# Patient Record
Sex: FEMALE | URBAN - METROPOLITAN AREA
[De-identification: names, ages, dates, MRNs, and addresses within clinical notes are randomized per-mention and may not be internally consistent; named-entity substitution may affect disease eponyms.]

---

## 2019-10-15 NOTE — PATIENT DISCUSSION
Recommend LID HYGIENE with warm compress and/or eyelid wash at least once a day. Suggest Avenova lid treatment OU qhs or Ocusoft.

## 2019-11-25 ENCOUNTER — APPOINTMENT (RX ONLY)
Dept: URBAN - METROPOLITAN AREA CLINIC 87 | Facility: CLINIC | Age: 49
Setting detail: DERMATOLOGY
End: 2019-11-25

## 2019-11-25 DIAGNOSIS — L29.8 OTHER PRURITUS: ICD-10-CM

## 2019-11-25 DIAGNOSIS — S31000A OPEN WOUND(S) (MULTIPLE) OF UNSPECIFIED SITE(S), WITHOUT MENTION OF COMPLICATION: ICD-10-CM

## 2019-11-25 DIAGNOSIS — L29.89 OTHER PRURITUS: ICD-10-CM

## 2019-11-25 DIAGNOSIS — L73.9 FOLLICULAR DISORDER, UNSPECIFIED: ICD-10-CM

## 2019-11-25 DIAGNOSIS — L663 OTHER SPECIFIED DISEASES OF HAIR AND HAIR FOLLICLES: ICD-10-CM

## 2019-11-25 DIAGNOSIS — L738 OTHER SPECIFIED DISEASES OF HAIR AND HAIR FOLLICLES: ICD-10-CM

## 2019-11-25 PROBLEM — L02.426 FURUNCLE OF LEFT LOWER LIMB: Status: ACTIVE | Noted: 2019-11-25

## 2019-11-25 PROBLEM — L02.425 FURUNCLE OF RIGHT LOWER LIMB: Status: ACTIVE | Noted: 2019-11-25

## 2019-11-25 PROBLEM — L02.821 FURUNCLE OF HEAD [ANY PART, EXCEPT FACE]: Status: ACTIVE | Noted: 2019-11-25

## 2019-11-25 PROBLEM — S81.801A UNSPECIFIED OPEN WOUND, RIGHT LOWER LEG, INITIAL ENCOUNTER: Status: ACTIVE | Noted: 2019-11-25

## 2019-11-25 PROCEDURE — 99202 OFFICE O/P NEW SF 15 MIN: CPT

## 2019-11-25 PROCEDURE — ? PRESCRIPTION

## 2019-11-25 PROCEDURE — ? TREATMENT REGIMEN

## 2019-11-25 PROCEDURE — ? ORDER TESTS

## 2019-11-25 PROCEDURE — ? COUNSELING

## 2019-11-25 RX ORDER — CRISABOROLE 20 MG/G
OINTMENT TOPICAL
Qty: 1 | Refills: 3 | Status: ERX | COMMUNITY
Start: 2019-11-25

## 2019-11-25 RX ORDER — MUPIROCIN 20 MG/G
OINTMENT TOPICAL
Qty: 1 | Refills: 0 | Status: ERX | COMMUNITY
Start: 2019-11-25

## 2019-11-25 RX ADMIN — MUPIROCIN: 20 OINTMENT TOPICAL at 00:00

## 2019-11-25 RX ADMIN — CRISABOROLE: 20 OINTMENT TOPICAL at 00:00

## 2019-11-25 ASSESSMENT — LOCATION ZONE DERM
LOCATION ZONE: LEG
LOCATION ZONE: ARM
LOCATION ZONE: SCALP

## 2019-11-25 ASSESSMENT — LOCATION DETAILED DESCRIPTION DERM
LOCATION DETAILED: RIGHT ANTERIOR DISTAL THIGH
LOCATION DETAILED: LEFT ANTERIOR DISTAL THIGH
LOCATION DETAILED: LEFT SUPERIOR PARIETAL SCALP
LOCATION DETAILED: RIGHT LATERAL DISTAL PRETIBIAL REGION
LOCATION DETAILED: MEDIAL FRONTAL SCALP
LOCATION DETAILED: RIGHT CENTRAL FRONTAL SCALP
LOCATION DETAILED: LEFT PROXIMAL POSTERIOR UPPER ARM

## 2019-11-25 ASSESSMENT — LOCATION SIMPLE DESCRIPTION DERM
LOCATION SIMPLE: FRONTAL SCALP
LOCATION SIMPLE: RIGHT THIGH
LOCATION SIMPLE: RIGHT PRETIBIAL REGION
LOCATION SIMPLE: LEFT POSTERIOR UPPER ARM
LOCATION SIMPLE: SCALP
LOCATION SIMPLE: RIGHT SCALP
LOCATION SIMPLE: LEFT THIGH

## 2019-11-25 NOTE — HPI: ITCHING
How Did Your Itching Occur?: sudden in onset (over a period of weeks to a few months)
Additional History: Patient states she feels in her scalp “bursting open like popcorn”. Pt states there is a white clear colored “gooey” substance that comes off her scalp. Pt states the discharge has a similar texture to “hair gel”

## 2019-11-25 NOTE — PROCEDURE: TREATMENT REGIMEN
Initiate Treatment: Cephalexin, take one by mouth twice daily for 10 days
Detail Level: Zone
Initiate Treatment: Apply twice daily for two weeks

## 2019-11-27 ENCOUNTER — APPOINTMENT (RX ONLY)
Dept: URBAN - METROPOLITAN AREA CLINIC 352 | Facility: CLINIC | Age: 49
Setting detail: DERMATOLOGY
End: 2019-11-27

## 2019-11-27 DIAGNOSIS — L29.8 OTHER PRURITUS: ICD-10-CM

## 2019-11-27 DIAGNOSIS — L08.9 LOCAL INFECTION OF THE SKIN AND SUBCUTANEOUS TISSUE, UNSPECIFIED: ICD-10-CM

## 2019-11-27 DIAGNOSIS — L29.89 OTHER PRURITUS: ICD-10-CM

## 2019-11-27 DIAGNOSIS — L21.8 OTHER SEBORRHEIC DERMATITIS: ICD-10-CM

## 2019-11-27 PROCEDURE — ? ORDER TESTS

## 2019-11-27 PROCEDURE — 99213 OFFICE O/P EST LOW 20 MIN: CPT

## 2019-11-27 PROCEDURE — ? COUNSELING

## 2019-11-27 PROCEDURE — ? PRESCRIPTION

## 2019-11-27 RX ORDER — KETOCONAZOLE 20.5 MG/ML
SHAMPOO, SUSPENSION TOPICAL
Qty: 1 | Refills: 11 | Status: ERX | COMMUNITY
Start: 2019-11-27

## 2019-11-27 RX ADMIN — KETOCONAZOLE: 20.5 SHAMPOO, SUSPENSION TOPICAL at 00:00

## 2019-11-27 ASSESSMENT — LOCATION DETAILED DESCRIPTION DERM
LOCATION DETAILED: RIGHT VENTRAL PROXIMAL FOREARM
LOCATION DETAILED: RIGHT MEDIAL UPPER BACK
LOCATION DETAILED: MID-FRONTAL SCALP
LOCATION DETAILED: LEFT VENTRAL PROXIMAL FOREARM
LOCATION DETAILED: LEFT MEDIAL FRONTAL SCALP

## 2019-11-27 ASSESSMENT — LOCATION SIMPLE DESCRIPTION DERM
LOCATION SIMPLE: RIGHT FOREARM
LOCATION SIMPLE: ANTERIOR SCALP
LOCATION SIMPLE: RIGHT UPPER BACK
LOCATION SIMPLE: LEFT SCALP
LOCATION SIMPLE: LEFT FOREARM

## 2019-11-27 ASSESSMENT — LOCATION ZONE DERM
LOCATION ZONE: SCALP
LOCATION ZONE: TRUNK
LOCATION ZONE: ARM

## 2019-11-27 NOTE — PROCEDURE: ORDER TESTS
Expected Date Of Service: 11/27/2019
Bill For Surgical Tray: no
Billing Type: Third-Party Bill
Performing Laboratory: -12

## 2019-11-27 NOTE — HPI: SKIN LESION
How Severe Is Your Skin Lesion?: moderate
Has Your Skin Lesion Been Treated?: been treated
Is This A New Presentation, Or A Follow-Up?: Skin Lesions
Additional History: Patient states she did not feel comfortable with [facility], would like a second opinion.  Feels lesion “pop” from scalp down face onto chest and hands.

## 2022-01-25 ENCOUNTER — NEW PATIENT (OUTPATIENT)
Dept: URBAN - METROPOLITAN AREA CLINIC 52 | Facility: CLINIC | Age: 52
End: 2022-01-25

## 2022-01-25 DIAGNOSIS — E11.319: ICD-10-CM

## 2022-01-25 PROCEDURE — 92002 INTRM OPH EXAM NEW PATIENT: CPT

## 2022-01-25 ASSESSMENT — TONOMETRY
OS_IOP_MMHG: 46
OD_IOP_MMHG: 15

## 2022-01-25 NOTE — PATIENT DISCUSSION
Dense Cataract OD>>OS per undilated view today. Will continue to monitor. Per Dr. Dexter Lomeli notes, may need to send for cataract surgery in near future once OS is stable.

## 2022-01-25 NOTE — PATIENT DISCUSSION
Patient is followed by [facility]. s/p anti-vegf injections OU. Last seen yesterday with Dr. Skylar Richter.  Next appointment is today with Dr. Madi Irwin at 3:30pm.

## 2022-01-25 NOTE — PATIENT DISCUSSION
Patient is followed by [facility]. s/p anti-vegf injections ou. Patient was seen by Dr. Kit Naranjo at [facility] yesterday and advised to see a Glaucoma specialist. A referral was not sent to our office. Patient is seeing Dr. Dexter Lomeli this afternoon. Patient saw Dr. Kit Naranjo yesterday 1/24/2022 and stated she was referred over for Glaucoma. There isn't an referral on file. Called Dr. Silver Knife office to get office note from yesterday but received an office note from 1/18/2022. Called again to get the office note lvm to get note but the note was still unsigned.  Dr. Joshua Heaton explained to patient that she isn't an glaucoma specialist and that we can give the patient information on doctors in the area who specialize in Glaucoma (Dr. Vibha Rooney or Dr. Maicol Vasquez). Patient stated she has an appointment with Dr. Dexter Lomeli this afternoon at 3:30; advised patient she keep that appointment. Recommend Dr. Dexter Lomeli send referral to [facility] specialist.  While patient was in office she was not dilated and secondary to elevated IOP in OS a Combigan drop was given at 10:07.

## 2022-01-25 NOTE — PATIENT DISCUSSION
IOP 15/46 today. IOP 20/34 on 1/18 with Dr. Atif Gibson. Patient states IOP was in 40-50s with Dr. Miguel Ángel Liu yesterday. Paracentesis OS done at last exam with Dr. Atif Gibson on 1/18/22. Per patient paracentesis was also performed on OD yesterday but this note has not been received. Continue current meds per [facility] and recommend Glaucoma specialist per Dr. Taco Galvan.